# Patient Record
Sex: FEMALE | Race: WHITE | NOT HISPANIC OR LATINO
[De-identification: names, ages, dates, MRNs, and addresses within clinical notes are randomized per-mention and may not be internally consistent; named-entity substitution may affect disease eponyms.]

---

## 2022-06-15 ENCOUNTER — TELEPHONE (OUTPATIENT)
Dept: SCHEDULING | Age: 27
End: 2022-06-15

## 2022-06-28 ENCOUNTER — OFFICE VISIT (OUTPATIENT)
Dept: FAMILY MEDICINE | Age: 27
End: 2022-06-28

## 2022-06-28 VITALS
HEIGHT: 69 IN | HEART RATE: 88 BPM | BODY MASS INDEX: 16.8 KG/M2 | WEIGHT: 113.4 LBS | OXYGEN SATURATION: 100 % | DIASTOLIC BLOOD PRESSURE: 88 MMHG | TEMPERATURE: 97.8 F | SYSTOLIC BLOOD PRESSURE: 133 MMHG

## 2022-06-28 DIAGNOSIS — H60.311 ACUTE DIFFUSE OTITIS EXTERNA OF RIGHT EAR: Primary | ICD-10-CM

## 2022-06-28 DIAGNOSIS — F41.1 GAD (GENERALIZED ANXIETY DISORDER): ICD-10-CM

## 2022-06-28 PROCEDURE — 99203 OFFICE O/P NEW LOW 30 MIN: CPT | Performed by: FAMILY MEDICINE

## 2022-06-28 RX ORDER — NEOMYCIN SULFATE, POLYMYXIN B SULFATE AND HYDROCORTISONE 10; 3.5; 1 MG/ML; MG/ML; [USP'U]/ML
3 SUSPENSION/ DROPS AURICULAR (OTIC) 4 TIMES DAILY
Qty: 10 ML | Refills: 0 | Status: SHIPPED | OUTPATIENT
Start: 2022-06-28 | End: 2022-07-05

## 2022-06-28 RX ORDER — TRAZODONE HYDROCHLORIDE 50 MG/1
50 TABLET ORAL NIGHTLY
Qty: 30 TABLET | Refills: 3 | Status: SHIPPED | OUTPATIENT
Start: 2022-06-28 | End: 2022-08-31 | Stop reason: SDUPTHER

## 2022-06-28 SDOH — HEALTH STABILITY: PHYSICAL HEALTH: ON AVERAGE, HOW MANY DAYS PER WEEK DO YOU ENGAGE IN MODERATE TO STRENUOUS EXERCISE (LIKE A BRISK WALK)?: 3 DAYS

## 2022-06-28 SDOH — HEALTH STABILITY: PHYSICAL HEALTH: ON AVERAGE, HOW MANY MINUTES DO YOU ENGAGE IN EXERCISE AT THIS LEVEL?: 60 MIN

## 2022-06-28 SDOH — HEALTH STABILITY: MENTAL HEALTH
STRESS IS WHEN SOMEONE FEELS TENSE, NERVOUS, ANXIOUS, OR CAN'T SLEEP AT NIGHT BECAUSE THEIR MIND IS TROUBLED. HOW STRESSED ARE YOU?: VERY MUCH

## 2022-06-28 ASSESSMENT — PATIENT HEALTH QUESTIONNAIRE - PHQ9
CLINICAL INTERPRETATION OF PHQ9 SCORE: MODERATE DEPRESSION
8. MOVING OR SPEAKING SO SLOWLY THAT OTHER PEOPLE COULD HAVE NOTICED. OR THE OPPOSITE, BEING SO FIGETY OR RESTLESS THAT YOU HAVE BEEN MOVING AROUND A LOT MORE THAN USUAL: NOT AT ALL
9. THOUGHTS THAT YOU WOULD BE BETTER OFF DEAD, OR OF HURTING YOURSELF: NOT AT ALL
5. POOR APPETITE, WEIGHT LOSS, OR OVEREATING: MORE THAN HALF THE DAYS
1. LITTLE INTEREST OR PLEASURE IN DOING THINGS: NEARLY EVERY DAY
2. FEELING DOWN, DEPRESSED OR HOPELESS: NEARLY EVERY DAY
6. FEELING BAD ABOUT YOURSELF - OR THAT YOU ARE A FAILURE OR HAVE LET YOURSELF OR YOUR FAMILY DOWN: NOT AT ALL
7. TROUBLE CONCENTRATING ON THINGS, SUCH AS READING THE NEWSPAPER OR WATCHING TELEVISION: SEVERAL DAYS
SUM OF ALL RESPONSES TO PHQ9 QUESTIONS 1 AND 2: 6
4. FEELING TIRED OR HAVING LITTLE ENERGY: SEVERAL DAYS
3. TROUBLE FALLING OR STAYING ASLEEP OR SLEEPING TOO MUCH: NEARLY EVERY DAY
CLINICAL INTERPRETATION OF PHQ2 SCORE: FURTHER SCREENING NEEDED
SUM OF ALL RESPONSES TO PHQ9 QUESTIONS 1 AND 2: 6
SUM OF ALL RESPONSES TO PHQ QUESTIONS 1-9: 13

## 2022-06-28 ASSESSMENT — ENCOUNTER SYMPTOMS
APPETITE CHANGE: 0
EYE DISCHARGE: 0
ACTIVITY CHANGE: 0
EYE ITCHING: 0

## 2022-06-28 ASSESSMENT — PAIN SCALES - GENERAL: PAINLEVEL: 6

## 2022-07-06 ENCOUNTER — TELEPHONE (OUTPATIENT)
Dept: SCHEDULING | Age: 27
End: 2022-07-06

## 2022-07-06 ENCOUNTER — APPOINTMENT (OUTPATIENT)
Dept: FAMILY MEDICINE | Age: 27
End: 2022-07-06

## 2022-07-07 ENCOUNTER — TELEPHONE (OUTPATIENT)
Dept: SCHEDULING | Age: 27
End: 2022-07-07

## 2022-07-13 ENCOUNTER — OFFICE VISIT (OUTPATIENT)
Dept: FAMILY MEDICINE | Age: 27
End: 2022-07-13

## 2022-07-13 VITALS
WEIGHT: 114.8 LBS | HEIGHT: 68 IN | OXYGEN SATURATION: 100 % | HEART RATE: 87 BPM | DIASTOLIC BLOOD PRESSURE: 83 MMHG | SYSTOLIC BLOOD PRESSURE: 121 MMHG | RESPIRATION RATE: 19 BRPM | TEMPERATURE: 96.8 F | BODY MASS INDEX: 17.4 KG/M2

## 2022-07-13 DIAGNOSIS — H60.319 CHRONIC DIFFUSE OTITIS EXTERNA, UNSPECIFIED LATERALITY: Primary | ICD-10-CM

## 2022-07-13 DIAGNOSIS — H61.21 IMPACTED CERUMEN OF RIGHT EAR: ICD-10-CM

## 2022-07-13 PROCEDURE — 69209 REMOVE IMPACTED EAR WAX UNI: CPT | Performed by: FAMILY MEDICINE

## 2022-07-13 PROCEDURE — 99213 OFFICE O/P EST LOW 20 MIN: CPT | Performed by: FAMILY MEDICINE

## 2022-07-13 RX ORDER — NEOMYCIN SULFATE, POLYMYXIN B SULFATE AND HYDROCORTISONE 10; 3.5; 1 MG/ML; MG/ML; [USP'U]/ML
3 SUSPENSION/ DROPS AURICULAR (OTIC) 4 TIMES DAILY
Qty: 10 ML | Refills: 0 | Status: SHIPPED | OUTPATIENT
Start: 2022-07-13 | End: 2022-07-20

## 2022-07-13 RX ORDER — PRENATAL VIT,CAL 76/IRON/FOLIC 29 MG-1 MG
1 TABLET ORAL DAILY
COMMUNITY
Start: 2022-06-21

## 2022-07-13 ASSESSMENT — ENCOUNTER SYMPTOMS
ACTIVITY CHANGE: 0
EYE ITCHING: 0
EYE DISCHARGE: 0
APPETITE CHANGE: 0

## 2022-07-13 ASSESSMENT — PAIN SCALES - GENERAL: PAINLEVEL: 0

## 2022-07-18 ENCOUNTER — APPOINTMENT (OUTPATIENT)
Dept: FAMILY MEDICINE | Age: 27
End: 2022-07-18

## 2022-08-31 ENCOUNTER — TELEPHONE (OUTPATIENT)
Dept: SCHEDULING | Age: 27
End: 2022-08-31

## 2022-08-31 DIAGNOSIS — F41.1 GAD (GENERALIZED ANXIETY DISORDER): ICD-10-CM

## 2022-08-31 RX ORDER — TRAZODONE HYDROCHLORIDE 50 MG/1
50 TABLET ORAL NIGHTLY
Qty: 30 TABLET | Refills: 3 | Status: SHIPPED | OUTPATIENT
Start: 2022-08-31 | End: 2022-09-07 | Stop reason: SDUPTHER

## 2022-09-07 DIAGNOSIS — F41.1 GAD (GENERALIZED ANXIETY DISORDER): ICD-10-CM

## 2022-09-07 RX ORDER — TRAZODONE HYDROCHLORIDE 50 MG/1
50 TABLET ORAL NIGHTLY
Qty: 30 TABLET | Refills: 0 | Status: SHIPPED | OUTPATIENT
Start: 2022-09-07 | End: 2022-09-15 | Stop reason: SDUPTHER

## 2022-09-15 DIAGNOSIS — F41.1 GAD (GENERALIZED ANXIETY DISORDER): ICD-10-CM

## 2022-09-15 RX ORDER — TRAZODONE HYDROCHLORIDE 50 MG/1
50 TABLET ORAL NIGHTLY
Qty: 30 TABLET | Refills: 0 | Status: SHIPPED | OUTPATIENT
Start: 2022-09-15 | End: 2022-09-19

## 2022-09-18 DIAGNOSIS — F41.1 GAD (GENERALIZED ANXIETY DISORDER): ICD-10-CM

## 2022-09-19 RX ORDER — TRAZODONE HYDROCHLORIDE 50 MG/1
50 TABLET ORAL NIGHTLY
Qty: 90 TABLET | Refills: 1 | Status: SHIPPED | OUTPATIENT
Start: 2022-09-19

## 2022-09-26 ENCOUNTER — APPOINTMENT (OUTPATIENT)
Dept: FAMILY MEDICINE | Age: 27
End: 2022-09-26

## 2022-10-13 ENCOUNTER — LAB SERVICES (OUTPATIENT)
Dept: LAB | Age: 27
End: 2022-10-13

## 2022-10-13 ENCOUNTER — OFFICE VISIT (OUTPATIENT)
Dept: FAMILY MEDICINE | Age: 27
End: 2022-10-13

## 2022-10-13 VITALS
TEMPERATURE: 98 F | WEIGHT: 111.5 LBS | DIASTOLIC BLOOD PRESSURE: 87 MMHG | HEIGHT: 68 IN | OXYGEN SATURATION: 100 % | RESPIRATION RATE: 16 BRPM | BODY MASS INDEX: 16.9 KG/M2 | SYSTOLIC BLOOD PRESSURE: 130 MMHG | HEART RATE: 107 BPM

## 2022-10-13 DIAGNOSIS — Z28.21 REFUSED INFLUENZA VACCINE: ICD-10-CM

## 2022-10-13 DIAGNOSIS — N92.6 IRREGULAR MENSTRUAL CYCLE: ICD-10-CM

## 2022-10-13 DIAGNOSIS — Z12.4 PAP SMEAR FOR CERVICAL CANCER SCREENING: Primary | ICD-10-CM

## 2022-10-13 DIAGNOSIS — Z00.01 ENCOUNTER FOR GENERAL ADULT MEDICAL EXAMINATION WITH ABNORMAL FINDINGS: ICD-10-CM

## 2022-10-13 DIAGNOSIS — D75.89 MACROCYTOSIS WITHOUT ANEMIA: ICD-10-CM

## 2022-10-13 LAB
B-HCG UR QL: NEGATIVE
INTERNAL PROCEDURAL CONTROLS ACCEPTABLE: YES

## 2022-10-13 PROCEDURE — 84443 ASSAY THYROID STIM HORMONE: CPT | Performed by: INTERNAL MEDICINE

## 2022-10-13 PROCEDURE — 88175 CYTOPATH C/V AUTO FLUID REDO: CPT | Performed by: INTERNAL MEDICINE

## 2022-10-13 PROCEDURE — 99395 PREV VISIT EST AGE 18-39: CPT | Performed by: FAMILY MEDICINE

## 2022-10-13 PROCEDURE — 87591 N.GONORRHOEAE DNA AMP PROB: CPT | Performed by: INTERNAL MEDICINE

## 2022-10-13 PROCEDURE — 82746 ASSAY OF FOLIC ACID SERUM: CPT | Performed by: INTERNAL MEDICINE

## 2022-10-13 PROCEDURE — 36415 COLL VENOUS BLD VENIPUNCTURE: CPT | Performed by: FAMILY MEDICINE

## 2022-10-13 PROCEDURE — 81025 URINE PREGNANCY TEST: CPT | Performed by: FAMILY MEDICINE

## 2022-10-13 PROCEDURE — 87491 CHLMYD TRACH DNA AMP PROBE: CPT | Performed by: INTERNAL MEDICINE

## 2022-10-13 PROCEDURE — 82607 VITAMIN B-12: CPT | Performed by: INTERNAL MEDICINE

## 2022-10-13 PROCEDURE — 85025 COMPLETE CBC W/AUTO DIFF WBC: CPT | Performed by: INTERNAL MEDICINE

## 2022-10-13 PROCEDURE — 80053 COMPREHEN METABOLIC PANEL: CPT | Performed by: INTERNAL MEDICINE

## 2022-10-13 SDOH — HEALTH STABILITY: PHYSICAL HEALTH: ON AVERAGE, HOW MANY MINUTES DO YOU ENGAGE IN EXERCISE AT THIS LEVEL?: 60 MIN

## 2022-10-13 SDOH — HEALTH STABILITY: MENTAL HEALTH
STRESS IS WHEN SOMEONE FEELS TENSE, NERVOUS, ANXIOUS, OR CAN'T SLEEP AT NIGHT BECAUSE THEIR MIND IS TROUBLED. HOW STRESSED ARE YOU?: QUITE A BIT

## 2022-10-13 SDOH — HEALTH STABILITY: PHYSICAL HEALTH: ON AVERAGE, HOW MANY DAYS PER WEEK DO YOU ENGAGE IN MODERATE TO STRENUOUS EXERCISE (LIKE A BRISK WALK)?: 3 DAYS

## 2022-10-13 ASSESSMENT — ENCOUNTER SYMPTOMS
DIZZINESS: 0
BRUISES/BLEEDS EASILY: 0
HEMATOLOGIC/LYMPHATIC NEGATIVE: 1
PHOTOPHOBIA: 0
POLYPHAGIA: 0
UNEXPECTED WEIGHT CHANGE: 0
ABDOMINAL DISTENTION: 0
COUGH: 0
APPETITE CHANGE: 0
ACTIVITY CHANGE: 0
EYE PAIN: 0
LIGHT-HEADEDNESS: 0
EYE REDNESS: 0
SEIZURES: 0
POLYDIPSIA: 0
ANAL BLEEDING: 0
ENDOCRINE NEGATIVE: 1
HEADACHES: 0
HALLUCINATIONS: 0
AGITATION: 0
EYE ITCHING: 0
BACK PAIN: 0
WOUND: 0
PSYCHIATRIC NEGATIVE: 1
FEVER: 0
ADENOPATHY: 0
EYES NEGATIVE: 1
APNEA: 0
CHILLS: 0
DIAPHORESIS: 0
TREMORS: 0
SPEECH DIFFICULTY: 0
EYE DISCHARGE: 0
GASTROINTESTINAL NEGATIVE: 1
CHEST TIGHTNESS: 0
NUMBNESS: 0
NEUROLOGICAL NEGATIVE: 1
WEAKNESS: 0
FACIAL ASYMMETRY: 0
SHORTNESS OF BREATH: 0
CONSTITUTIONAL NEGATIVE: 1
CHOKING: 0
COLOR CHANGE: 0
CONFUSION: 0
FATIGUE: 0
ALLERGIC/IMMUNOLOGIC NEGATIVE: 1
ABDOMINAL PAIN: 0
RESPIRATORY NEGATIVE: 1

## 2022-10-13 ASSESSMENT — PATIENT HEALTH QUESTIONNAIRE - PHQ9
SUM OF ALL RESPONSES TO PHQ9 QUESTIONS 1 AND 2: 1
CLINICAL INTERPRETATION OF PHQ2 SCORE: NO FURTHER SCREENING NEEDED
SUM OF ALL RESPONSES TO PHQ9 QUESTIONS 1 AND 2: 1
2. FEELING DOWN, DEPRESSED OR HOPELESS: SEVERAL DAYS
1. LITTLE INTEREST OR PLEASURE IN DOING THINGS: NOT AT ALL

## 2022-10-13 ASSESSMENT — PAIN SCALES - GENERAL: PAINLEVEL: 0

## 2022-10-14 LAB
ALBUMIN SERPL-MCNC: 4.4 G/DL (ref 3.6–5.1)
ALBUMIN/GLOB SERPL: 1.2 {RATIO} (ref 1–2.4)
ALP SERPL-CCNC: 98 UNITS/L (ref 45–117)
ALT SERPL-CCNC: 28 UNITS/L
ANION GAP SERPL CALC-SCNC: 13 MMOL/L (ref 7–19)
AST SERPL-CCNC: 23 UNITS/L
BASOPHILS # BLD: 0.1 K/MCL (ref 0–0.3)
BASOPHILS NFR BLD: 1 %
BILIRUB SERPL-MCNC: 0.3 MG/DL (ref 0.2–1)
BUN SERPL-MCNC: 11 MG/DL (ref 6–20)
BUN/CREAT SERPL: 19 (ref 7–25)
C TRACH RRNA CVX QL NAA+PROBE: NEGATIVE
CALCIUM SERPL-MCNC: 10.1 MG/DL (ref 8.4–10.2)
CHLORIDE SERPL-SCNC: 103 MMOL/L (ref 97–110)
CO2 SERPL-SCNC: 27 MMOL/L (ref 21–32)
CREAT SERPL-MCNC: 0.59 MG/DL (ref 0.51–0.95)
DEPRECATED RDW RBC: 44.5 FL (ref 39–50)
EOSINOPHIL # BLD: 0.1 K/MCL (ref 0–0.5)
EOSINOPHIL NFR BLD: 1 %
ERYTHROCYTE [DISTWIDTH] IN BLOOD: 11.8 % (ref 11–15)
FASTING DURATION TIME PATIENT: 4 HOURS (ref 0–999)
GFR SERPLBLD BASED ON 1.73 SQ M-ARVRAT: >90 ML/MIN
GLOBULIN SER-MCNC: 3.7 G/DL (ref 2–4)
GLUCOSE SERPL-MCNC: 86 MG/DL (ref 70–99)
HCT VFR BLD CALC: 41 % (ref 36–46.5)
HGB BLD-MCNC: 13.7 G/DL (ref 12–15.5)
IMM GRANULOCYTES # BLD AUTO: 0 K/MCL (ref 0–0.2)
IMM GRANULOCYTES # BLD: 0 %
LYMPHOCYTES # BLD: 1.8 K/MCL (ref 1–4.8)
LYMPHOCYTES NFR BLD: 17 %
Lab: NORMAL
MCH RBC QN AUTO: 34.1 PG (ref 26–34)
MCHC RBC AUTO-ENTMCNC: 33.4 G/DL (ref 32–36.5)
MCV RBC AUTO: 102 FL (ref 78–100)
MONOCYTES # BLD: 0.7 K/MCL (ref 0.3–0.9)
MONOCYTES NFR BLD: 6 %
N GONORRHOEA RRNA CVX QL NAA+PROBE: NEGATIVE
NEUTROPHILS # BLD: 8 K/MCL (ref 1.8–7.7)
NEUTROPHILS NFR BLD: 75 %
NRBC BLD MANUAL-RTO: 0 /100 WBC
PLATELET # BLD AUTO: 447 K/MCL (ref 140–450)
POTASSIUM SERPL-SCNC: 4.8 MMOL/L (ref 3.4–5.1)
PROT SERPL-MCNC: 8.1 G/DL (ref 6.4–8.2)
RBC # BLD: 4.02 MIL/MCL (ref 4–5.2)
SODIUM SERPL-SCNC: 138 MMOL/L (ref 135–145)
TSH SERPL-ACNC: 1.88 MCUNITS/ML (ref 0.35–5)
WBC # BLD: 10.7 K/MCL (ref 4.2–11)

## 2022-10-15 LAB — HOLD SPECIMEN: NORMAL

## 2022-10-17 LAB
CASE RPRT: NORMAL
CLINICAL INFO: NORMAL
CYTOLOGY CVX/VAG STUDY: NORMAL
FOLATE SERPL-MCNC: >24 NG/ML
PAP EDUCATIONAL NOTE: NORMAL
SPECIMEN ADEQUACY: NORMAL
VIT B12 SERPL-MCNC: 1293 PG/ML (ref 211–911)

## 2023-01-30 ENCOUNTER — APPOINTMENT (OUTPATIENT)
Dept: URBAN - METROPOLITAN AREA CLINIC 314 | Age: 28
Setting detail: DERMATOLOGY
End: 2023-01-30

## 2023-01-30 DIAGNOSIS — Z12.83 ENCOUNTER FOR SCREENING FOR MALIGNANT NEOPLASM OF SKIN: ICD-10-CM

## 2023-01-30 DIAGNOSIS — D22 MELANOCYTIC NEVI: ICD-10-CM

## 2023-01-30 PROBLEM — D22.5 MELANOCYTIC NEVI OF TRUNK: Status: ACTIVE | Noted: 2023-01-30

## 2023-01-30 PROBLEM — D23.62 OTHER BENIGN NEOPLASM OF SKIN OF LEFT UPPER LIMB, INCLUDING SHOULDER: Status: ACTIVE | Noted: 2023-01-30

## 2023-01-30 PROCEDURE — OTHER COUNSELING: OTHER

## 2023-01-30 PROCEDURE — 99203 OFFICE O/P NEW LOW 30 MIN: CPT

## 2023-01-30 PROCEDURE — OTHER EDUCATIONAL RESOURCES PROVIDED: OTHER

## 2023-01-30 ASSESSMENT — LOCATION DETAILED DESCRIPTION DERM: LOCATION DETAILED: SUPERIOR LUMBAR SPINE

## 2023-01-30 ASSESSMENT — LOCATION SIMPLE DESCRIPTION DERM: LOCATION SIMPLE: LOWER BACK

## 2023-01-30 ASSESSMENT — LOCATION ZONE DERM: LOCATION ZONE: TRUNK

## 2023-01-30 NOTE — PROCEDURE: COUNSELING
Detail Level: Generalized
Detail Level: Detailed
Patient Specific Counseling (Will Not Stick From Patient To Patient): \\nPt reports this has been present since birth